# Patient Record
Sex: FEMALE | Race: WHITE
[De-identification: names, ages, dates, MRNs, and addresses within clinical notes are randomized per-mention and may not be internally consistent; named-entity substitution may affect disease eponyms.]

---

## 2021-03-15 ENCOUNTER — HOSPITAL ENCOUNTER (EMERGENCY)
Dept: HOSPITAL 63 - ER | Age: 21
Discharge: HOME | End: 2021-03-15
Payer: COMMERCIAL

## 2021-03-15 VITALS — WEIGHT: 143.74 LBS | BODY MASS INDEX: 25.47 KG/M2 | HEIGHT: 63 IN

## 2021-03-15 VITALS — SYSTOLIC BLOOD PRESSURE: 122 MMHG | DIASTOLIC BLOOD PRESSURE: 78 MMHG
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
## 2021-03-15 DIAGNOSIS — S20.224A: ICD-10-CM
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
## 2021-03-15 DIAGNOSIS — S50.02XA: ICD-10-CM

## 2021-03-15 DIAGNOSIS — S06.0X9A: Primary | ICD-10-CM
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
## 2021-03-15 DIAGNOSIS — S00.432A: ICD-10-CM

## 2021-03-15 PROCEDURE — 72125 CT NECK SPINE W/O DYE: CPT

## 2021-03-15 PROCEDURE — 90471 IMMUNIZATION ADMIN: CPT

## 2021-03-15 PROCEDURE — 81025 URINE PREGNANCY TEST: CPT

## 2021-03-15 PROCEDURE — 90714 TD VACC NO PRESV 7 YRS+ IM: CPT

## 2021-03-15 PROCEDURE — 70450 CT HEAD/BRAIN W/O DYE: CPT

## 2024-10-07 NOTE — RAD
CT head without contrast:



Reason for examination: Fell with head and neck injury.



Helical images were obtained through the brain. No contrast was administered.



Ventricular systems are symmetric and not dilated. No midline shift is seen. There is no evidence of 
intracranial hemorrhage, infarct, mass or edema. No abnormalities of seen at the orbits. The paranasa
l sinuses and mastoid air cells are clear. No acute abnormality seen in the skull.



IMPRESSION:



No acute intracranial abnormality evident.





CT cervical spine without contrast:



Helical images were obtained through the cervical spine from skull base through the thoracic apices w
ith no contrast administered. Reconstruction was performed in sagittal and coronal planes.



The C1 ring is intact. The odontoid process appears to be intact and normally centered between the la
teral masses of C1. The vertebral bodies of the cervical spine are normally aligned anteriorly and po
steriorly. No acute fracture or subluxation is seen. Posterior elements are intact. The intervertebra
l disc heights are maintained. Prevertebral soft tissues are normal. There is no spinal stenosis. Mus
cular bundles appear symmetric.



IMPRESSION:



No acute abnormality evident in the cervical spine.



Exposure: One or more of the following individualized dose reduction techniques were utilized for thi
s examination:  1. Automated exposure control  2. Adjustment of the mA and/or kV according to patient
 size  3. Use of iterative reconstruction technique.



Electronically signed by: Sugey Coronado MD (3/15/2021 11:13 PM) KENDRICK
Female